# Patient Record
Sex: FEMALE | Race: WHITE | NOT HISPANIC OR LATINO | Employment: UNEMPLOYED | URBAN - METROPOLITAN AREA
[De-identification: names, ages, dates, MRNs, and addresses within clinical notes are randomized per-mention and may not be internally consistent; named-entity substitution may affect disease eponyms.]

---

## 2022-05-10 ENCOUNTER — TREATMENT (OUTPATIENT)
Dept: PHYSICAL THERAPY | Facility: CLINIC | Age: 22
End: 2022-05-10

## 2022-05-10 ENCOUNTER — TRANSCRIBE ORDERS (OUTPATIENT)
Dept: PHYSICAL THERAPY | Facility: CLINIC | Age: 22
End: 2022-05-10

## 2022-05-10 DIAGNOSIS — M65.4 DE QUERVAIN'S DISEASE (TENOSYNOVITIS): ICD-10-CM

## 2022-05-10 DIAGNOSIS — M25.531 RIGHT WRIST PAIN: ICD-10-CM

## 2022-05-10 DIAGNOSIS — M65.4 DE QUERVAIN'S DISEASE (TENOSYNOVITIS): Primary | ICD-10-CM

## 2022-05-10 DIAGNOSIS — Z47.89 ORTHOPEDIC AFTERCARE: Primary | ICD-10-CM

## 2022-05-10 DIAGNOSIS — Z47.89 ORTHOPEDIC AFTERCARE: ICD-10-CM

## 2022-05-10 PROCEDURE — L3906 WHO W/O JOINTS CF: HCPCS | Performed by: PHYSICAL THERAPIST

## 2022-05-11 NOTE — PROGRESS NOTES
Markos Ballard 2000   Diagnosis/ Surgery: Right DeQuervain's Traumatic, wrist pain, S/P 1st Dorsal compartment release.          Date Of Onset: February 2021    Date Of Surgery:4/29/2022    Hand Dominance: Right  History of Present Condition: Playing softball for [employer], she fell on right hand injuring 1st dorsal compartment. Injections and rehab did not help. Underwent a surgical release of 1st dorsal compartment. They have been sent to PT for post-operative splinting.  Medical/Vocational History/ Medications: 1st semester in [employer] school at [employer]. PMH see MD notes    Pain: Mild at surgery site.    Edema: Minimal at surgery site.  Sensibility: WNL   Wound Status:Surgical wound healing well at 1st dorsal compartment.  ROM/ Strength: Not assessed due to surgical precautions.    Splinting:  · Patient was measure and fit with a custom fabricated volar forearm based wrist/hand/thumb immobilization splint with IP joint free.   · Patient was instructed in wearing schedule, precautions and care of the splint during this visit.   · Patient was instructed in proper donning/doffing of splint.   Assessment:  · Patient was fitted and appropriate splint was fabricated this date.  · Patient reported that splint was comfortable and had no complications with the fit of the splint.  · Patient was instructed and patient verbalized understanding of precautions, wear and care of the splint.   · Patient demonstrated independent donning/doffing of splint during treatment today.  Goals:  · Patient was fitted properly with appropriate splint for diagnosis  · Patient was educated on precautions, wear schedule and care of splint  · Patient demonstrated independence with donning/doffing of the splint.  · Splint was provided to Protect Healing Structures, Restrict Mobility, Improve joint alignment.  Plan:  · No additional treatment is required for this patient at this time. The patient is therefore  discharged from therapy.  · Patient advised to contact therapist with any additional questions or concerns regarding the fit and function of the splint.  · Patient will be seen for splint issues as needed   · Wear Instructions: Off for hygiene       PT SIGNATURE: Chiki Gardiner PT, T  License Number: [id number]  Electronically signed by Chiki Gardiner PT, 05/10/22, 1:35 PM EDT

## 2023-05-08 ENCOUNTER — OFFICE VISIT (OUTPATIENT)
Dept: CARDIOLOGY | Facility: CLINIC | Age: 23
End: 2023-05-08
Payer: COMMERCIAL

## 2023-05-08 VITALS
HEIGHT: 64 IN | WEIGHT: 162 LBS | DIASTOLIC BLOOD PRESSURE: 72 MMHG | HEART RATE: 84 BPM | SYSTOLIC BLOOD PRESSURE: 100 MMHG | BODY MASS INDEX: 27.66 KG/M2

## 2023-05-08 DIAGNOSIS — R00.2 PALPITATIONS: Primary | ICD-10-CM

## 2023-05-08 DIAGNOSIS — R07.89 CHEST TIGHTNESS: ICD-10-CM

## 2023-05-08 DIAGNOSIS — R00.0 TACHYCARDIA: ICD-10-CM

## 2023-05-08 DIAGNOSIS — R42 DIZZINESS: ICD-10-CM

## 2023-05-08 DIAGNOSIS — E88.81 METABOLIC SYNDROME: ICD-10-CM

## 2023-05-08 DIAGNOSIS — R06.02 SHORTNESS OF BREATH: ICD-10-CM

## 2023-05-08 PROBLEM — E88.810 METABOLIC SYNDROME: Status: ACTIVE | Noted: 2023-05-08

## 2023-05-08 RX ORDER — DILTIAZEM HYDROCHLORIDE 180 MG/1
180 CAPSULE, COATED, EXTENDED RELEASE ORAL DAILY
Qty: 30 CAPSULE | Refills: 6 | Status: SHIPPED | OUTPATIENT
Start: 2023-05-08

## 2023-05-08 RX ORDER — CITALOPRAM 20 MG/1
20 TABLET ORAL DAILY
COMMUNITY

## 2023-05-08 NOTE — LETTER
May 8, 2023       No Recipients    Patient: Markos Ballard   YOB: 2000   Date of Visit: 5/8/2023       Dear Dr. Mae Recipients:    Thank you for referring Markos Ballard to me for evaluation. Below are the relevant portions of my assessment and plan of care.    If you have questions, please do not hesitate to call me. I look forward to following Markos along with you.         Sincerely,        Jessica Mars MD        CC:   No Recipients    Jessica Mars MD  05/08/23 1524  Sign when Signing Visit  Chief Complaint   Patient presents with   • Establish Care     Referred for palpitations. Last lab work from 02/27/23 is in referral along with last EKG. PCP started her on propranolol, but she did not notice improvements, so it was changed to atenolol, but again did not notice improvement, so she went off. Has had a lot of stress recently with mother having kidney transplant in March and has had complications and father was the donor.    • Aspirin     Patient is not on aspirin.    • Palpitations     Had COVID in October 2022. In January 2023, she started noticing palpitations especially while working out. Noticed that HR while working out was 200, highest it has got was 202, chest will feet tight, she will feel light headed, and get tunnel vision. States that she has noticed that resting HR has been in the 90s, when before it was normally in the 60s. PCP ordered holter for 3 days through [facility] a couple of months ago, but patient has been told results are not back. Called [facility], they do not have report.   • Chest Pain     States that she will occasionally have chest tightness without exercising, had some this morning.    • Shortness of Breath     Has noticed getting short of breath with walking upstairs, states that it doesn't take long for her to catch her breath, but that is new for her.        CARDIAC COMPLAINTS  chest pressure/discomfort, dyspnea and palpitations     Subjective   Markos Ballard  is a 22 y.o. female came in today for her initial cardiac evaluation.  She had no previously diagnosed cardiac history.  She developed COVID infection in October 22.  She was treated with Paxlovid.  She had a lot of problems with shortness of breath and cough at that time.  Then in January she started noticing palpitation.  It is more of a increased heart rate while she is working out.  Normally her heart rate goes up to about 150 but now she started noticing going higher than 200.  During that time she started feeling chest tightness in the form of pressure-like feeling in the middle part of the chest associated with some tunnel vision.  She also has been noticing increasing shortness of breath on minimal exertion.  She apparently had a monitor placed for 3 days few months ago but apparently it has not been read.  She is still waiting for the report.  She was tried on Inderal which did not help and then she tried atenolol which also showed no improvement.  Regarding the shortness of breath it occurs mostly on exertion and very slightly at rest.  It is not associated with any orthopnea.  She also had some labs done and found to have a normal blood count.  Renal function was normal.  Her liver function was slightly abnormal which could be related to the COVID.  Her cholesterol was normal at 123 with the LDL of 69.  Her TSH was normal with a normal vitamin D.  She has no history of smoking or drinking alcohol.  She used to drink few energy drinks but she has cut down on that also.  She is also under a lot of stress since her mother apparently had kidney transplant with her father being the donor.  She is not sure exactly the reason for the kidney transplant.    History reviewed. No pertinent surgical history.    Current Outpatient Medications   Medication Sig Dispense Refill   • citalopram (CeleXA) 20 MG tablet Take 1 tablet by mouth Daily.     • dilTIAZem CD (Cardizem CD) 180 MG 24 hr capsule Take 1 capsule by mouth  "Daily. 30 capsule 6     No current facility-administered medications for this visit.          ALLERGIES:  Patient has no known allergies.    Past Medical History:   Diagnosis Date   • History of appendectomy    • History of shoulder surgery    • History of surgery on wrist        Social History     Tobacco Use   Smoking Status Never   Smokeless Tobacco Never          Family History   Problem Relation Age of Onset   • Hypertension Mother    • Kidney disease Mother    • Heart attack Paternal Grandmother        Review of Systems   Constitutional: Negative for decreased appetite and malaise/fatigue.   HENT: Negative for congestion and sore throat.    Eyes: Negative for blurred vision, double vision and visual disturbance.   Cardiovascular: Positive for chest pain, dyspnea on exertion and palpitations.   Respiratory: Positive for shortness of breath. Negative for snoring.    Endocrine: Negative for cold intolerance and heat intolerance.   Hematologic/Lymphatic: Negative for adenopathy. Does not bruise/bleed easily.   Skin: Negative for itching, nail changes and skin cancer.   Musculoskeletal: Negative for arthritis and myalgias.   Gastrointestinal: Negative for abdominal pain, dysphagia and heartburn.   Genitourinary: Negative for bladder incontinence and frequency.   Neurological: Negative for dizziness, seizures and vertigo.   Psychiatric/Behavioral: Negative for altered mental status.   Allergic/Immunologic: Negative for environmental allergies and hives.       Diabetes- No  Thyroid- normal    Objective     /72 (BP Location: Left arm)   Pulse 84   Ht 162.6 cm (64\")   Wt 73.5 kg (162 lb)   BMI 27.81 kg/m²     Vitals and nursing note reviewed.   Constitutional:       Appearance: Healthy appearance. Not in distress.   Eyes:      Conjunctiva/sclera: Conjunctivae normal.      Pupils: Pupils are equal, round, and reactive to light.   HENT:      Head: Normocephalic.   Pulmonary:      Effort: Pulmonary effort is " normal.      Breath sounds: Normal breath sounds.   Cardiovascular:      PMI at left midclavicular line. Normal rate. Regular rhythm.      Murmurs: There is a grade 3/6 high frequency blowing holosystolic murmur at the apex.   Abdominal:      General: Bowel sounds are normal.      Palpations: Abdomen is soft.   Musculoskeletal: Normal range of motion.      Cervical back: Normal range of motion and neck supple. Skin:     General: Skin is warm and dry.   Neurological:      Mental Status: Alert, oriented to person, place, and time and oriented to person, place and time.           ECG 12 Lead    Date/Time: 5/8/2023 3:23 PM  Performed by: Jessica Mars MD  Authorized by: Jessica Mars MD   Comparison: compared with previous ECG from 2/27/2023  Similar to previous ECG  Rhythm: sinus rhythm and sinus arrhythmia  Rate: normal  QRS axis: normal  Other findings: T wave abnormality    Clinical impression: abnormal EKG             @ASSESSMENT/PLAN@  BMI is >= 25 and <30. (Overweight) The following options were offered after discussion;: weight loss educational material (shared in after visit summary), exercise counseling/recommendations and nutrition counseling/recommendations     Diagnoses and all orders for this visit:    1. Palpitations (Primary)  -     Treadmill Stress Test; Future  -     Adult Transthoracic Echo Complete W/ Cont if Necessary Per Protocol; Future  -     dilTIAZem CD (Cardizem CD) 180 MG 24 hr capsule; Take 1 capsule by mouth Daily.  Dispense: 30 capsule; Refill: 6    2. Dizziness  -     XR Chest PA & Lateral; Future    3. Chest tightness  -     Treadmill Stress Test; Future    4. Shortness of breath  -     Adult Transthoracic Echo Complete W/ Cont if Necessary Per Protocol; Future  -     XR Chest PA & Lateral; Future    5. Metabolic syndrome    6. Tachycardia  -     dilTIAZem CD (Cardizem CD) 180 MG 24 hr capsule; Take 1 capsule by mouth Daily.  Dispense: 30 capsule; Refill: 6    At baseline  her heart rate is stable.  Her blood pressure is normal.  Her BMI is around 28.  Her EKG showed sinus rhythm, sinus arrhythmia, T wave changes in the inferior leads.  Her cardiovascular examination reveals systolic murmur at the mitral area    Regarding the palpitation, I do like to review the Holter monitor.  She apparently did have some palpitation at that time.  That will help us determine whether she has inappropriate sinus tachycardia or any other arrhythmia.  I will really appreciate, if you can send me copy of it.  At this time she has already 2 different beta-blockers which seems not helping.  I am going to try with Cardizem CD at 180 mg once a day.  If she had problem with hypotension and arrhythmia still persisting then we may have to try Corlanor.    Regarding her chest tightness, she does have a mildly abnormal EKG.  This could be nonspecific findings since ischemia is less likely at this engage.  I like to do a stress test to evaluate her functional status, chronotropic response, blood pressure response and to rule out any stress-induced ischemia or arrhythmia    Regarding her shortness of breath, I like to get an echocardiogram to rule out any evidence of myocarditis and also to rule out any pericardial effusion.  She also need the echocardiogram to evaluate the valvular structures and the PA pressure.  I also advised her to undergo an x-ray chest PA and lateral.    Regarding metabolic syndrome, she is mildly overweight.  I talked to her about diet.  She is on intermittent fasting diet now.  I gave her papers on Mediterranean diet also    Regarding the tachycardia, it could be a inappropriate tachycardia but it could be secondary to her COVID.  Based on the response to the medication, further recommendations will be made.              Electronically signed by Jessica Mars MD May 8, 2023 14:48 EDT

## 2023-05-08 NOTE — PROGRESS NOTES
Chief Complaint   Patient presents with   • Establish Care     Referred for palpitations. Last lab work from 02/27/23 is in referral along with last EKG. PCP started her on propranolol, but she did not notice improvements, so it was changed to atenolol, but again did not notice improvement, so she went off. Has had a lot of stress recently with mother having kidney transplant in March and has had complications and father was the donor.    • Aspirin     Patient is not on aspirin.    • Palpitations     Had COVID in October 2022. In January 2023, she started noticing palpitations especially while working out. Noticed that HR while working out was 200, highest it has got was 202, chest will feet tight, she will feel light headed, and get tunnel vision. States that she has noticed that resting HR has been in the 90s, when before it was normally in the 60s. PCP ordered holter for 3 days through [facility] a couple of months ago, but patient has been told results are not back. Called [facility], they do not have report.   • Chest Pain     States that she will occasionally have chest tightness without exercising, had some this morning.    • Shortness of Breath     Has noticed getting short of breath with walking upstairs, states that it doesn't take long for her to catch her breath, but that is new for her.        CARDIAC COMPLAINTS  chest pressure/discomfort, dyspnea and palpitations      Subjective   Markos Ballard is a 22 y.o. female came in today for her initial cardiac evaluation.  She had no previously diagnosed cardiac history.  She developed COVID infection in October 22.  She was treated with Paxlovid.  She had a lot of problems with shortness of breath and cough at that time.  Then in January she started noticing palpitation.  It is more of a increased heart rate while she is working out.  Normally her heart rate goes up to about 150 but now she started noticing going higher than 200.  During that time she started feeling chest  tightness in the form of pressure-like feeling in the middle part of the chest associated with some tunnel vision.  She also has been noticing increasing shortness of breath on minimal exertion.  She apparently had a monitor placed for 3 days few months ago but apparently it has not been read.  She is still waiting for the report.  She was tried on Inderal which did not help and then she tried atenolol which also showed no improvement.  Regarding the shortness of breath it occurs mostly on exertion and very slightly at rest.  It is not associated with any orthopnea.  She also had some labs done and found to have a normal blood count.  Renal function was normal.  Her liver function was slightly abnormal which could be related to the COVID.  Her cholesterol was normal at 123 with the LDL of 69.  Her TSH was normal with a normal vitamin D.  She has no history of smoking or drinking alcohol.  She used to drink few energy drinks but she has cut down on that also.  She is also under a lot of stress since her mother apparently had kidney transplant with her father being the donor.  She is not sure exactly the reason for the kidney transplant.    History reviewed. No pertinent surgical history.    Current Outpatient Medications   Medication Sig Dispense Refill   • citalopram (CeleXA) 20 MG tablet Take 1 tablet by mouth Daily.     • dilTIAZem CD (Cardizem CD) 180 MG 24 hr capsule Take 1 capsule by mouth Daily. 30 capsule 6     No current facility-administered medications for this visit.           ALLERGIES:  Patient has no known allergies.    Past Medical History:   Diagnosis Date   • History of appendectomy    • History of shoulder surgery    • History of surgery on wrist        Social History     Tobacco Use   Smoking Status Never   Smokeless Tobacco Never          Family History   Problem Relation Age of Onset   • Hypertension Mother    • Kidney disease Mother    • Heart attack Paternal Grandmother        Review of  "Systems   Constitutional: Negative for decreased appetite and malaise/fatigue.   HENT: Negative for congestion and sore throat.    Eyes: Negative for blurred vision, double vision and visual disturbance.   Cardiovascular: Positive for chest pain, dyspnea on exertion and palpitations.   Respiratory: Positive for shortness of breath. Negative for snoring.    Endocrine: Negative for cold intolerance and heat intolerance.   Hematologic/Lymphatic: Negative for adenopathy. Does not bruise/bleed easily.   Skin: Negative for itching, nail changes and skin cancer.   Musculoskeletal: Negative for arthritis and myalgias.   Gastrointestinal: Negative for abdominal pain, dysphagia and heartburn.   Genitourinary: Negative for bladder incontinence and frequency.   Neurological: Negative for dizziness, seizures and vertigo.   Psychiatric/Behavioral: Negative for altered mental status.   Allergic/Immunologic: Negative for environmental allergies and hives.       Diabetes- No  Thyroid- normal    Objective     /72 (BP Location: Left arm)   Pulse 84   Ht 162.6 cm (64\")   Wt 73.5 kg (162 lb)   BMI 27.81 kg/m²     Vitals and nursing note reviewed.   Constitutional:       Appearance: Healthy appearance. Not in distress.   Eyes:      Conjunctiva/sclera: Conjunctivae normal.      Pupils: Pupils are equal, round, and reactive to light.   HENT:      Head: Normocephalic.   Pulmonary:      Effort: Pulmonary effort is normal.      Breath sounds: Normal breath sounds.   Cardiovascular:      PMI at left midclavicular line. Normal rate. Regular rhythm.      Murmurs: There is a grade 3/6 high frequency blowing holosystolic murmur at the apex.   Abdominal:      General: Bowel sounds are normal.      Palpations: Abdomen is soft.   Musculoskeletal: Normal range of motion.      Cervical back: Normal range of motion and neck supple. Skin:     General: Skin is warm and dry.   Neurological:      Mental Status: Alert, oriented to person, place, " and time and oriented to person, place and time.           ECG 12 Lead    Date/Time: 5/8/2023 3:23 PM  Performed by: Jessica Mars MD  Authorized by: Jessica Mars MD   Comparison: compared with previous ECG from 2/27/2023  Similar to previous ECG  Rhythm: sinus rhythm and sinus arrhythmia  Rate: normal  QRS axis: normal  Other findings: T wave abnormality    Clinical impression: abnormal EKG              @ASSESSMENT/PLAN@  BMI is >= 25 and <30. (Overweight) The following options were offered after discussion;: weight loss educational material (shared in after visit summary), exercise counseling/recommendations and nutrition counseling/recommendations     Diagnoses and all orders for this visit:    1. Palpitations (Primary)  -     Treadmill Stress Test; Future  -     Adult Transthoracic Echo Complete W/ Cont if Necessary Per Protocol; Future  -     dilTIAZem CD (Cardizem CD) 180 MG 24 hr capsule; Take 1 capsule by mouth Daily.  Dispense: 30 capsule; Refill: 6    2. Dizziness  -     XR Chest PA & Lateral; Future    3. Chest tightness  -     Treadmill Stress Test; Future    4. Shortness of breath  -     Adult Transthoracic Echo Complete W/ Cont if Necessary Per Protocol; Future  -     XR Chest PA & Lateral; Future    5. Metabolic syndrome    6. Tachycardia  -     dilTIAZem CD (Cardizem CD) 180 MG 24 hr capsule; Take 1 capsule by mouth Daily.  Dispense: 30 capsule; Refill: 6    At baseline her heart rate is stable.  Her blood pressure is normal.  Her BMI is around 28.  Her EKG showed sinus rhythm, sinus arrhythmia, T wave changes in the inferior leads.  Her cardiovascular examination reveals systolic murmur at the mitral area    Regarding the palpitation, I do like to review the Holter monitor.  She apparently did have some palpitation at that time.  That will help us determine whether she has inappropriate sinus tachycardia or any other arrhythmia.  I will really appreciate, if you can send me copy of  it.  At this time she has already 2 different beta-blockers which seems not helping.  I am going to try with Cardizem CD at 180 mg once a day.  If she had problem with hypotension and arrhythmia still persisting then we may have to try Corlanor.    Regarding her chest tightness, she does have a mildly abnormal EKG.  This could be nonspecific findings since ischemia is less likely at this engage.  I like to do a stress test to evaluate her functional status, chronotropic response, blood pressure response and to rule out any stress-induced ischemia or arrhythmia    Regarding her shortness of breath, I like to get an echocardiogram to rule out any evidence of myocarditis and also to rule out any pericardial effusion.  She also need the echocardiogram to evaluate the valvular structures and the PA pressure.  I also advised her to undergo an x-ray chest PA and lateral.    Regarding metabolic syndrome, she is mildly overweight.  I talked to her about diet.  She is on intermittent fasting diet now.  I gave her papers on Mediterranean diet also    Regarding the tachycardia, it could be a inappropriate tachycardia but it could be secondary to her COVID.  Based on the response to the medication, further recommendations will be made.               Electronically signed by Jessica Mars MD May 8, 2023 14:48 EDT

## 2023-05-22 ENCOUNTER — HOSPITAL ENCOUNTER (OUTPATIENT)
Dept: CARDIOLOGY | Facility: HOSPITAL | Age: 23
Discharge: HOME | End: 2023-05-22
Payer: COMMERCIAL

## 2023-05-22 VITALS — BODY MASS INDEX: 27.66 KG/M2 | HEIGHT: 64 IN | WEIGHT: 162.04 LBS

## 2023-05-22 DIAGNOSIS — R00.2 PALPITATIONS: ICD-10-CM

## 2023-05-22 DIAGNOSIS — R07.89 CHEST TIGHTNESS: ICD-10-CM

## 2023-05-22 DIAGNOSIS — R06.02 SHORTNESS OF BREATH: ICD-10-CM

## 2023-05-22 LAB
AORTIC DIMENSIONLESS INDEX: 0.96 (DI)
BH CV ECHO MEAS - ACS: 1.77 CM
BH CV ECHO MEAS - AO MAX PG: 3.7 MMHG
BH CV ECHO MEAS - AO MEAN PG: 2.13 MMHG
BH CV ECHO MEAS - AO ROOT DIAM: 2.7 CM
BH CV ECHO MEAS - AO V2 MAX: 95.6 CM/SEC
BH CV ECHO MEAS - AO V2 VTI: 21.3 CM
BH CV ECHO MEAS - EDV(CUBED): 80.3 ML
BH CV ECHO MEAS - EF(MOD-BP): 55 %
BH CV ECHO MEAS - EF_3D-VOL: 52 %
BH CV ECHO MEAS - ESV(CUBED): 29.2 ML
BH CV ECHO MEAS - FS: 28.6 %
BH CV ECHO MEAS - IVS/LVPW: 0.95 CM
BH CV ECHO MEAS - IVSD: 0.89 CM
BH CV ECHO MEAS - LA DIMENSION: 3.2 CM
BH CV ECHO MEAS - LAT PEAK E' VEL: 14.7 CM/SEC
BH CV ECHO MEAS - LV MASS(C)D: 126.2 GRAMS
BH CV ECHO MEAS - LV MAX PG: 3.5 MMHG
BH CV ECHO MEAS - LV MEAN PG: 1.67 MMHG
BH CV ECHO MEAS - LV V1 MAX: 93 CM/SEC
BH CV ECHO MEAS - LV V1 VTI: 19 CM
BH CV ECHO MEAS - LVIDD: 4.3 CM
BH CV ECHO MEAS - LVIDS: 3.1 CM
BH CV ECHO MEAS - LVPWD: 0.94 CM
BH CV ECHO MEAS - MED PEAK E' VEL: 14.2 CM/SEC
BH CV ECHO MEAS - MV A MAX VEL: 24.7 CM/SEC
BH CV ECHO MEAS - MV DEC SLOPE: 600.5 CM/SEC2
BH CV ECHO MEAS - MV DEC TIME: 0.26 MSEC
BH CV ECHO MEAS - MV E MAX VEL: 102 CM/SEC
BH CV ECHO MEAS - MV E/A: 4.1
BH CV ECHO MEAS - MV MAX PG: 7.1 MMHG
BH CV ECHO MEAS - MV MEAN PG: 2.07 MMHG
BH CV ECHO MEAS - MV P1/2T: 61.7 MSEC
BH CV ECHO MEAS - MV V2 VTI: 32.5 CM
BH CV ECHO MEAS - MVA(P1/2T): 3.6 CM2
BH CV ECHO MEAS - PA V2 MAX: 104.3 CM/SEC
BH CV ECHO MEAS - RAP SYSTOLE: 8 MMHG
BH CV ECHO MEAS - RV MAX PG: 2.6 MMHG
BH CV ECHO MEAS - RV V1 MAX: 81.1 CM/SEC
BH CV ECHO MEAS - RV V1 VTI: 17.3 CM
BH CV ECHO MEAS - RVDD: 2.8 CM
BH CV ECHO MEAS - RVSP: 13.1 MMHG
BH CV ECHO MEAS - TAPSE (>1.6): 2.8 CM
BH CV ECHO MEAS - TR MAX PG: 5.1 MMHG
BH CV ECHO MEAS - TR MAX VEL: 113 CM/SEC
BH CV ECHO MEASUREMENTS AVERAGE E/E' RATIO: 7.06
BH CV STRESS DURATION MIN STAGE 1: 3
BH CV STRESS DURATION SEC STAGE 1: 0
BH CV STRESS GRADE STAGE 1: 10
BH CV STRESS METS STAGE 1: 5
BH CV STRESS PROTOCOL 1: NORMAL
BH CV STRESS RECOVERY BP: NORMAL MMHG
BH CV STRESS RECOVERY HR: 111 BPM
BH CV STRESS SPEED STAGE 1: 1.7
BH CV STRESS STAGE 1: 1
BH CV XLRA - TDI S': 13.2 CM/SEC
MAXIMAL PREDICTED HEART RATE: 198 BPM
PERCENT MAX PREDICTED HR: 97.47 %
SINUS: 2.6 CM
STRESS BASELINE BP: NORMAL MMHG
STRESS BASELINE HR: 67 BPM
STRESS PERCENT HR: 115 %
STRESS POST ESTIMATED WORKLOAD: 13.4 METS
STRESS POST EXERCISE DUR MIN: 11 MIN
STRESS POST EXERCISE DUR SEC: 4 SEC
STRESS POST PEAK BP: NORMAL MMHG
STRESS POST PEAK HR: 193 BPM
STRESS TARGET HR: 168 BPM

## 2023-05-22 PROCEDURE — 93306 TTE W/DOPPLER COMPLETE: CPT | Performed by: INTERNAL MEDICINE

## 2023-05-22 PROCEDURE — 93017 CV STRESS TEST TRACING ONLY: CPT

## 2023-05-22 PROCEDURE — 93306 TTE W/DOPPLER COMPLETE: CPT

## 2023-05-23 RX ORDER — DILTIAZEM HYDROCHLORIDE 120 MG/1
240 CAPSULE, COATED, EXTENDED RELEASE ORAL DAILY
Qty: 90 CAPSULE | Refills: 3 | Status: SHIPPED | OUTPATIENT
Start: 2023-05-23 | End: 2023-05-23

## 2023-05-23 RX ORDER — DILTIAZEM HYDROCHLORIDE 240 MG/1
240 CAPSULE, COATED, EXTENDED RELEASE ORAL DAILY
Qty: 90 CAPSULE | Refills: 3 | Status: SHIPPED | OUTPATIENT
Start: 2023-05-23

## 2023-08-09 ENCOUNTER — OFFICE VISIT (OUTPATIENT)
Dept: CARDIOLOGY | Facility: CLINIC | Age: 23
End: 2023-08-09
Payer: COMMERCIAL

## 2023-08-09 VITALS
SYSTOLIC BLOOD PRESSURE: 112 MMHG | BODY MASS INDEX: 30.73 KG/M2 | WEIGHT: 180 LBS | HEIGHT: 64 IN | HEART RATE: 72 BPM | DIASTOLIC BLOOD PRESSURE: 56 MMHG

## 2023-08-09 DIAGNOSIS — R00.0 TACHYCARDIA: ICD-10-CM

## 2023-08-09 DIAGNOSIS — R42 DIZZINESS: ICD-10-CM

## 2023-08-09 DIAGNOSIS — E88.81 METABOLIC SYNDROME: ICD-10-CM

## 2023-08-09 DIAGNOSIS — R00.2 PALPITATIONS: Primary | ICD-10-CM

## 2023-08-09 DIAGNOSIS — R53.82 CHRONIC FATIGUE: ICD-10-CM

## 2023-08-09 DIAGNOSIS — R06.02 SHORTNESS OF BREATH: ICD-10-CM

## 2023-08-09 PROCEDURE — 99214 OFFICE O/P EST MOD 30 MIN: CPT | Performed by: INTERNAL MEDICINE

## 2023-08-09 RX ORDER — DILTIAZEM HYDROCHLORIDE 240 MG/1
240 CAPSULE, COATED, EXTENDED RELEASE ORAL DAILY
Qty: 90 CAPSULE | Refills: 4 | Status: SHIPPED | OUTPATIENT
Start: 2023-08-09

## 2023-08-09 NOTE — PROGRESS NOTES
Chief Complaint   Patient presents with    Follow-up     For cardiac management, doing good, heart rate doing much better.     Med Refill     Refills needed on Cardizem. 90 days to [facility].     labs     No labs since last visit.        CARDIAC COMPLAINTS  Cardiac management        Subjective   Markos Ballard is a 22 y.o. female came in today for her follow-up visit.  She has history of COVID infection in October of last year.  She was treated with Paxil of it.  She started developing shortness of breath and cough.  She also started noticing tachycardia and she was not able to do much activities.  She was tried on beta-blockers both Inderal and atenolol and it did not help her.  She was not put on Cardizem CD.  She underwent investigation.  Her echocardiogram shows normal LV function and stress test showed mild hypertensive blood pressure response and mild increase in chronotropic response.  Cardizem dose was increased.  She came today stating that from cardiac point she is doing much better.  Her heart rate and blood pressure is doing much better.  She has been steadily gaining weight.  She has gained almost 18 pounds in the last 3 to 4 months.  She has not had any labs done since February.              Cardiac History  Past Surgical History:   Procedure Laterality Date    CARDIOVASCULAR STRESS TEST  05/22/2023    R.Stress- 11.04 Min. 13.4 METS. 97% THR.162/54. Up sloping ST. Negative.    ECHO - CONVERTED  05/22/2023    EF 60%. Trace-Mild MR. RVSP- 13 mmHg       Current Outpatient Medications   Medication Sig Dispense Refill    citalopram (CeleXA) 20 MG tablet Take 1 tablet by mouth Daily.      dilTIAZem CD (Cardizem CD) 240 MG 24 hr capsule Take 1 capsule by mouth Daily. 90 capsule 4     No current facility-administered medications for this visit.       Allergies  :  Patient has no known allergies.       Past Medical History:   Diagnosis Date    History of appendectomy     History of shoulder  "surgery     History of surgery on wrist        Social History     Socioeconomic History    Marital status: Single   Tobacco Use    Smoking status: Never    Smokeless tobacco: Never   Vaping Use    Vaping Use: Never used   Substance and Sexual Activity    Alcohol use: Yes     Comment: Socially, not even weekly    Drug use: Never       Family History   Problem Relation Age of Onset    Hypertension Mother     Kidney disease Mother     Heart attack Paternal Grandmother        Review of Systems   Constitutional: Positive for malaise/fatigue and weight gain. Negative for decreased appetite.   HENT:  Negative for congestion and sore throat.    Eyes:  Negative for blurred vision, double vision and visual disturbance.   Cardiovascular:  Negative for chest pain and palpitations.   Respiratory:  Negative for shortness of breath and snoring.    Endocrine: Negative for cold intolerance and heat intolerance.   Hematologic/Lymphatic: Negative for adenopathy. Does not bruise/bleed easily.   Skin:  Negative for itching, nail changes and skin cancer.   Musculoskeletal:  Negative for arthritis and myalgias.   Gastrointestinal:  Negative for abdominal pain, dysphagia and heartburn.   Genitourinary:  Negative for bladder incontinence and frequency.   Neurological:  Negative for dizziness, seizures and vertigo.   Psychiatric/Behavioral:  Negative for altered mental status.    Allergic/Immunologic: Negative for environmental allergies and hives.     Diabetes- No  Thyroid- normal    Objective     /56   Pulse 72   Ht 162.6 cm (64\")   Wt 81.6 kg (180 lb)   BMI 30.90 kg/mý     Vitals and nursing note reviewed.   Constitutional:       Appearance: Healthy appearance. Not in distress.   Eyes:      Conjunctiva/sclera: Conjunctivae normal.      Pupils: Pupils are equal, round, and reactive to light.   HENT:      Head: Normocephalic.   Pulmonary:      Effort: Pulmonary effort is normal.      Breath sounds: Normal breath sounds. "   Cardiovascular:      PMI at left midclavicular line. Normal rate. Regular rhythm.   Abdominal:      General: Bowel sounds are normal.      Palpations: Abdomen is soft.   Musculoskeletal: Normal range of motion.      Cervical back: Normal range of motion and neck supple. Skin:     General: Skin is warm and dry.   Neurological:      Mental Status: Alert, oriented to person, place, and time and oriented to person, place and time.   Procedures            @ASSESSMENT/PLAN@        Diagnoses and all orders for this visit:    1. Palpitations (Primary)  -     dilTIAZem CD (Cardizem CD) 240 MG 24 hr capsule; Take 1 capsule by mouth Daily.  Dispense: 90 capsule; Refill: 4  -     TSH; Future  -     T4, Free; Future    2. Tachycardia  -     dilTIAZem CD (Cardizem CD) 240 MG 24 hr capsule; Take 1 capsule by mouth Daily.  Dispense: 90 capsule; Refill: 4  -     Comprehensive Metabolic Panel; Future    3. Metabolic syndrome    4. Shortness of breath    5. Dizziness    6. Chronic fatigue  -     CBC & Differential; Future  -     Vitamin B12; Future  -     Folate; Future  -     Cancel: Cortisol; Future  -     Cortisol; Future       At baseline her heart rate is stable.  Her blood pressure is normal.  Her BMI is gone up to 31.  Her cardiovascular examination is otherwise unremarkable.    Regarding the palpitation, it seems to be helping her.  Continue the Cardizem CD and advised her to recheck the thyroid function test    Regarding her tachycardia, it seems to be getting better with the Cardizem CD so we will continue the same    Regarding the metabolic syndrome which is progressively getting worse, she may need to have workup done including checking cortisol level    Regarding the shortness of breath, once the heart rate and blood pressure got better symptoms also got better so we will continue to monitor    Regarding the dizziness, it is subsided after getting the heart rate is controlled    Regarding the chronic fatigue, need to  check B12 and folic acid level    Overall cardiac status appears stable.  I will see her back in a year or sooner if needed                  Electronically signed by Jessica Mars MD August 9, 2023 14:58 EDT

## 2023-08-09 NOTE — LETTER
August 9, 2023       No Recipients    Patient: Markos Ballard   YOB: 2000   Date of Visit: 8/9/2023     Dear Antwan Blanton MD:       Thank you for referring Markos Ballard to me for evaluation. Below are the relevant portions of my assessment and plan of care.    If you have questions, please do not hesitate to call me. I look forward to following Markos along with you.         Sincerely,        Jessica Mars MD        CC:   No Recipients    Jessica Mars MD  08/09/23 1165  Sign when Signing Visit  Chief Complaint   Patient presents with    Follow-up     For cardiac management, doing good, heart rate doing much better.     Med Refill     Refills needed on Cardizem. 90 days to [facility].     labs     No labs since last visit.        CARDIAC COMPLAINTS  Cardiac management        Subjective  Markos Ballard is a 22 y.o. female came in today for her follow-up visit.  She has history of COVID infection in October of last year.  She was treated with Paxil of it.  She started developing shortness of breath and cough.  She also started noticing tachycardia and she was not able to do much activities.  She was tried on beta-blockers both Inderal and atenolol and it did not help her.  She was not put on Cardizem CD.  She underwent investigation.  Her echocardiogram shows normal LV function and stress test showed mild hypertensive blood pressure response and mild increase in chronotropic response.  Cardizem dose was increased.  She came today stating that from cardiac point she is doing much better.  Her heart rate and blood pressure is doing much better.  She has been steadily gaining weight.  She has gained almost 18 pounds in the last 3 to 4 months.  She has not had any labs done since February.              Cardiac History  Past Surgical History:   Procedure Laterality Date    CARDIOVASCULAR STRESS TEST  05/22/2023    R.Stress- 11.04 Min. 13.4 METS. 97% THR.162/54. Up  sloping ST. Negative.    ECHO - CONVERTED  05/22/2023    EF 60%. Trace-Mild MR. RVSP- 13 mmHg       Current Outpatient Medications   Medication Sig Dispense Refill    citalopram (CeleXA) 20 MG tablet Take 1 tablet by mouth Daily.      dilTIAZem CD (Cardizem CD) 240 MG 24 hr capsule Take 1 capsule by mouth Daily. 90 capsule 4     No current facility-administered medications for this visit.       Allergies  :  Patient has no known allergies.       Past Medical History:   Diagnosis Date    History of appendectomy     History of shoulder surgery     History of surgery on wrist        Social History     Socioeconomic History    Marital status: Single   Tobacco Use    Smoking status: Never    Smokeless tobacco: Never   Vaping Use    Vaping Use: Never used   Substance and Sexual Activity    Alcohol use: Yes     Comment: Socially, not even weekly    Drug use: Never       Family History   Problem Relation Age of Onset    Hypertension Mother     Kidney disease Mother     Heart attack Paternal Grandmother        Review of Systems   Constitutional: Positive for malaise/fatigue and weight gain. Negative for decreased appetite.   HENT:  Negative for congestion and sore throat.    Eyes:  Negative for blurred vision, double vision and visual disturbance.   Cardiovascular:  Negative for chest pain and palpitations.   Respiratory:  Negative for shortness of breath and snoring.    Endocrine: Negative for cold intolerance and heat intolerance.   Hematologic/Lymphatic: Negative for adenopathy. Does not bruise/bleed easily.   Skin:  Negative for itching, nail changes and skin cancer.   Musculoskeletal:  Negative for arthritis and myalgias.   Gastrointestinal:  Negative for abdominal pain, dysphagia and heartburn.   Genitourinary:  Negative for bladder incontinence and frequency.   Neurological:  Negative for dizziness, seizures and vertigo.   Psychiatric/Behavioral:  Negative for altered mental status.   "  Allergic/Immunologic: Negative for environmental allergies and hives.     Diabetes- No  Thyroid- normal    Objective    /56   Pulse 72   Ht 162.6 cm (64\")   Wt 81.6 kg (180 lb)   BMI 30.90 kg/mý     Vitals and nursing note reviewed.   Constitutional:       Appearance: Healthy appearance. Not in distress.   Eyes:      Conjunctiva/sclera: Conjunctivae normal.      Pupils: Pupils are equal, round, and reactive to light.   HENT:      Head: Normocephalic.   Pulmonary:      Effort: Pulmonary effort is normal.      Breath sounds: Normal breath sounds.   Cardiovascular:      PMI at left midclavicular line. Normal rate. Regular rhythm.   Abdominal:      General: Bowel sounds are normal.      Palpations: Abdomen is soft.   Musculoskeletal: Normal range of motion.      Cervical back: Normal range of motion and neck supple. Skin:     General: Skin is warm and dry.   Neurological:      Mental Status: Alert, oriented to person, place, and time and oriented to person, place and time.   Procedures            @ASSESSMENT/PLAN@        Diagnoses and all orders for this visit:    1. Palpitations (Primary)  -     dilTIAZem CD (Cardizem CD) 240 MG 24 hr capsule; Take 1 capsule by mouth Daily.  Dispense: 90 capsule; Refill: 4  -     TSH; Future  -     T4, Free; Future    2. Tachycardia  -     dilTIAZem CD (Cardizem CD) 240 MG 24 hr capsule; Take 1 capsule by mouth Daily.  Dispense: 90 capsule; Refill: 4  -     Comprehensive Metabolic Panel; Future    3. Metabolic syndrome    4. Shortness of breath    5. Dizziness    6. Chronic fatigue  -     CBC & Differential; Future  -     Vitamin B12; Future  -     Folate; Future  -     Cancel: Cortisol; Future  -     Cortisol; Future       At baseline her heart rate is stable.  Her blood pressure is normal.  Her BMI is gone up to 31.  Her cardiovascular examination is otherwise unremarkable.    Regarding the palpitation, it seems to be helping her.  Continue the Cardizem CD and advised her " to recheck the thyroid function test    Regarding her tachycardia, it seems to be getting better with the Cardizem CD so we will continue the same    Regarding the metabolic syndrome which is progressively getting worse, she may need to have workup done including checking cortisol level    Regarding the shortness of breath, once the heart rate and blood pressure got better symptoms also got better so we will continue to monitor    Regarding the dizziness, it is subsided after getting the heart rate is controlled    Regarding the chronic fatigue, need to check B12 and folic acid level    Overall cardiac status appears stable.  I will see her back in a year or sooner if needed                  Electronically signed by Jessica Mars MD August 9, 2023 14:58 EDT

## 2024-09-17 ENCOUNTER — OFFICE VISIT (OUTPATIENT)
Dept: CARDIOLOGY | Facility: CLINIC | Age: 24
End: 2024-09-17
Payer: COMMERCIAL

## 2024-09-17 VITALS
DIASTOLIC BLOOD PRESSURE: 74 MMHG | SYSTOLIC BLOOD PRESSURE: 122 MMHG | HEIGHT: 64 IN | HEART RATE: 74 BPM | WEIGHT: 205 LBS | BODY MASS INDEX: 35 KG/M2

## 2024-09-17 DIAGNOSIS — E88.810 METABOLIC SYNDROME: ICD-10-CM

## 2024-09-17 DIAGNOSIS — R42 DIZZINESS: ICD-10-CM

## 2024-09-17 DIAGNOSIS — R07.89 CHEST TIGHTNESS: ICD-10-CM

## 2024-09-17 DIAGNOSIS — R06.02 SHORTNESS OF BREATH: ICD-10-CM

## 2024-09-17 DIAGNOSIS — R00.2 PALPITATIONS: Primary | ICD-10-CM

## 2024-09-17 DIAGNOSIS — R11.0 NAUSEA: ICD-10-CM

## 2024-09-17 PROCEDURE — 99214 OFFICE O/P EST MOD 30 MIN: CPT | Performed by: INTERNAL MEDICINE

## 2024-09-17 RX ORDER — DILTIAZEM HYDROCHLORIDE 240 MG/1
240 CAPSULE, COATED, EXTENDED RELEASE ORAL DAILY
Qty: 90 CAPSULE | Refills: 4 | Status: SHIPPED | OUTPATIENT
Start: 2024-09-17

## 2024-09-17 RX ORDER — DILTIAZEM HYDROCHLORIDE 240 MG/1
240 CAPSULE, COATED, EXTENDED RELEASE ORAL DAILY
COMMUNITY
End: 2024-09-17 | Stop reason: SDUPTHER

## 2024-09-17 RX ORDER — SERTRALINE HYDROCHLORIDE 25 MG/1
25 TABLET, FILM COATED ORAL DAILY
COMMUNITY

## 2024-09-17 RX ORDER — FAMOTIDINE 40 MG/1
40 TABLET, FILM COATED ORAL DAILY
Qty: 90 TABLET | Refills: 4 | Status: SHIPPED | OUTPATIENT
Start: 2024-09-17

## 2024-09-17 RX ORDER — DICYCLOMINE HYDROCHLORIDE 10 MG/1
10 CAPSULE ORAL
Qty: 90 CAPSULE | Refills: 3 | Status: SHIPPED | OUTPATIENT
Start: 2024-09-17

## 2024-09-17 RX ORDER — ELETRIPTAN HYDROBROMIDE 40 MG/1
40 TABLET, FILM COATED ORAL ONCE AS NEEDED
COMMUNITY